# Patient Record
Sex: MALE | URBAN - METROPOLITAN AREA
[De-identification: names, ages, dates, MRNs, and addresses within clinical notes are randomized per-mention and may not be internally consistent; named-entity substitution may affect disease eponyms.]

---

## 2018-12-19 ENCOUNTER — OFFICE VISIT (OUTPATIENT)
Dept: INTERNAL MEDICINE CLINIC | Facility: CLINIC | Age: 58
End: 2018-12-19
Payer: COMMERCIAL

## 2018-12-19 VITALS
BODY MASS INDEX: 28.62 KG/M2 | DIASTOLIC BLOOD PRESSURE: 90 MMHG | HEART RATE: 69 BPM | SYSTOLIC BLOOD PRESSURE: 155 MMHG | HEIGHT: 76 IN | WEIGHT: 235 LBS

## 2018-12-19 DIAGNOSIS — I10 ESSENTIAL HYPERTENSION: ICD-10-CM

## 2018-12-19 DIAGNOSIS — Z00.00 ADULT GENERAL MEDICAL EXAM: ICD-10-CM

## 2018-12-19 DIAGNOSIS — Z12.5 SCREENING PSA (PROSTATE SPECIFIC ANTIGEN): Primary | ICD-10-CM

## 2018-12-19 DIAGNOSIS — Z12.11 SCREEN FOR COLON CANCER: ICD-10-CM

## 2018-12-19 DIAGNOSIS — Z23 NEED FOR INFLUENZA VACCINATION: ICD-10-CM

## 2018-12-19 PROCEDURE — 99203 OFFICE O/P NEW LOW 30 MIN: CPT | Performed by: INTERNAL MEDICINE

## 2018-12-19 PROCEDURE — 90686 IIV4 VACC NO PRSV 0.5 ML IM: CPT | Performed by: INTERNAL MEDICINE

## 2018-12-19 PROCEDURE — 90471 IMMUNIZATION ADMIN: CPT | Performed by: INTERNAL MEDICINE

## 2018-12-19 RX ORDER — AMLODIPINE BESYLATE 5 MG/1
5 TABLET ORAL DAILY
Qty: 90 TABLET | Refills: 3 | Status: SHIPPED | OUTPATIENT
Start: 2018-12-19 | End: 2019-03-19

## 2018-12-19 NOTE — PATIENT INSTRUCTIONS
As we discussed, your blood pressure is high and I started you on a medication called amlodipine. Please take the medicine once a day. Please try to avoid salt as much as possible. Please exercise as we discussed. I have ordered blood works for you.   P dressings  For snacks and desserts  · Yogurt  · Unsalted, air-popped popcorn  · Unsalted nuts or seeds  Stay away from:  · Pies and cakes  · Packaged dessert mixes  · Pizza  · Canned and packaged puddings  · Pretzels, chips, crackers, and nuts—unless the l

## 2018-12-19 NOTE — PROGRESS NOTES
Akhil Ball is a 59year old male. Patient presents with:  Physical: has not had a physical in years, BP was elevated at dental appt recently    HPI:   6-0 past medical history none-year-old male with no significant past medical history here to establish cough, chest tightness and wheezing. Cardiovascular: Negative for chest pain, palpitations and leg swelling. Gastrointestinal: Negative for vomiting, abdominal pain, diarrhea, constipation, blood in stool and abdominal distention.    Endocrine: Henrietta Ruiz REFLEX TO FREE T4; Future    3. Essential hypertension  Discussed low-salt diet. Encourage exercise. Encourage abstinence from tobacco started on amlodipine 5 mg once a day. - CBC, PLATELET; NO DIFFERENTIAL; Future  - COMP METABOLIC PANEL (14);  Future

## 2019-03-08 ENCOUNTER — APPOINTMENT (OUTPATIENT)
Dept: LAB | Age: 59
End: 2019-03-08
Attending: INTERNAL MEDICINE
Payer: COMMERCIAL

## 2019-03-08 DIAGNOSIS — I10 ESSENTIAL HYPERTENSION: ICD-10-CM

## 2019-03-08 DIAGNOSIS — Z12.5 SCREENING PSA (PROSTATE SPECIFIC ANTIGEN): ICD-10-CM

## 2019-03-08 DIAGNOSIS — Z00.00 ADULT GENERAL MEDICAL EXAM: ICD-10-CM

## 2019-03-08 LAB
ALBUMIN SERPL-MCNC: 4 G/DL (ref 3.4–5)
ALBUMIN/GLOB SERPL: 1.3 {RATIO} (ref 1–2)
ALP LIVER SERPL-CCNC: 50 U/L (ref 45–117)
ALT SERPL-CCNC: 29 U/L (ref 16–61)
ANION GAP SERPL CALC-SCNC: 6 MMOL/L (ref 0–18)
AST SERPL-CCNC: 19 U/L (ref 15–37)
BILIRUB SERPL-MCNC: 0.9 MG/DL (ref 0.1–2)
BUN BLD-MCNC: 14 MG/DL (ref 7–18)
BUN/CREAT SERPL: 14.6 (ref 10–20)
CALCIUM BLD-MCNC: 8.8 MG/DL (ref 8.5–10.1)
CHLORIDE SERPL-SCNC: 107 MMOL/L (ref 98–107)
CHOLEST SMN-MCNC: 238 MG/DL (ref ?–200)
CO2 SERPL-SCNC: 27 MMOL/L (ref 21–32)
COMPLEXED PSA SERPL-MCNC: 0.88 NG/ML (ref ?–4)
CREAT BLD-MCNC: 0.96 MG/DL (ref 0.7–1.3)
DEPRECATED RDW RBC AUTO: 44.3 FL (ref 35.1–46.3)
ERYTHROCYTE [DISTWIDTH] IN BLOOD BY AUTOMATED COUNT: 12.3 % (ref 11–15)
GLOBULIN PLAS-MCNC: 3 G/DL (ref 2.8–4.4)
GLUCOSE BLD-MCNC: 80 MG/DL (ref 70–99)
HCT VFR BLD AUTO: 44 % (ref 39–53)
HDLC SERPL-MCNC: 45 MG/DL (ref 40–59)
HGB BLD-MCNC: 14.7 G/DL (ref 13–17.5)
LDLC SERPL CALC-MCNC: 169 MG/DL (ref ?–100)
M PROTEIN MFR SERPL ELPH: 7 G/DL (ref 6.4–8.2)
MCH RBC QN AUTO: 32.6 PG (ref 26–34)
MCHC RBC AUTO-ENTMCNC: 33.4 G/DL (ref 31–37)
MCV RBC AUTO: 97.6 FL (ref 80–100)
NONHDLC SERPL-MCNC: 193 MG/DL (ref ?–130)
OSMOLALITY SERPL CALC.SUM OF ELEC: 289 MOSM/KG (ref 275–295)
PLATELET # BLD AUTO: 229 10(3)UL (ref 150–450)
POTASSIUM SERPL-SCNC: 4 MMOL/L (ref 3.5–5.1)
RBC # BLD AUTO: 4.51 X10(6)UL (ref 4.3–5.7)
SODIUM SERPL-SCNC: 140 MMOL/L (ref 136–145)
TRIGL SERPL-MCNC: 119 MG/DL (ref 30–149)
TSI SER-ACNC: 2.47 MIU/ML (ref 0.36–3.74)
VLDLC SERPL CALC-MCNC: 24 MG/DL (ref 0–30)
WBC # BLD AUTO: 5.9 X10(3) UL (ref 4–11)

## 2019-03-08 PROCEDURE — 84443 ASSAY THYROID STIM HORMONE: CPT

## 2019-03-08 PROCEDURE — 36415 COLL VENOUS BLD VENIPUNCTURE: CPT

## 2019-03-08 PROCEDURE — 80053 COMPREHEN METABOLIC PANEL: CPT

## 2019-03-08 PROCEDURE — 80061 LIPID PANEL: CPT

## 2019-03-08 PROCEDURE — 85027 COMPLETE CBC AUTOMATED: CPT

## 2019-03-19 ENCOUNTER — OFFICE VISIT (OUTPATIENT)
Dept: INTERNAL MEDICINE CLINIC | Facility: CLINIC | Age: 59
End: 2019-03-19
Payer: COMMERCIAL

## 2019-03-19 VITALS
WEIGHT: 229 LBS | DIASTOLIC BLOOD PRESSURE: 80 MMHG | SYSTOLIC BLOOD PRESSURE: 138 MMHG | HEART RATE: 65 BPM | HEIGHT: 76 IN | BODY MASS INDEX: 27.89 KG/M2

## 2019-03-19 DIAGNOSIS — M25.562 PAIN IN BOTH KNEES, UNSPECIFIED CHRONICITY: ICD-10-CM

## 2019-03-19 DIAGNOSIS — M25.561 PAIN IN BOTH KNEES, UNSPECIFIED CHRONICITY: ICD-10-CM

## 2019-03-19 DIAGNOSIS — E78.5 DYSLIPIDEMIA: ICD-10-CM

## 2019-03-19 DIAGNOSIS — I10 ESSENTIAL HYPERTENSION: Primary | ICD-10-CM

## 2019-03-19 PROCEDURE — 99214 OFFICE O/P EST MOD 30 MIN: CPT | Performed by: INTERNAL MEDICINE

## 2019-03-19 RX ORDER — AMLODIPINE BESYLATE 5 MG/1
5 TABLET ORAL DAILY
Qty: 90 TABLET | Refills: 2 | Status: SHIPPED | OUTPATIENT
Start: 2019-03-19 | End: 2019-06-17

## 2019-03-19 NOTE — PATIENT INSTRUCTIONS
As we discussed, may be rechecked your blood pressure, it was good. We will continue the same medications. Try to avoid salt as much as possible. Continue to exercise. He also have high cholesterol with elevated bad cholesterol.   Try to avoid fat fri

## 2019-03-19 NOTE — PROGRESS NOTES
Jerzy Bourne is a 59year old male. Patient presents with:  Hypertension: 3 month f/u  Knee Pain: bilateral    HPI:   8year-old gentleman with a past medical history of hypertension here for follow-up.   During his blood test he was also found to have dysl kg)  12/19/18 : 235 lb (106.6 kg)    Body mass index is 27.87 kg/m².       EXAM:   /80 (BP Location: Left arm, Patient Position: Sitting, Cuff Size: adult)   Pulse 65   Ht 6' 4\" (1.93 m)   Wt 229 lb (103.9 kg)   BMI 27.87 kg/m²      Physical Exam

## 2019-09-25 ENCOUNTER — OFFICE VISIT (OUTPATIENT)
Dept: INTERNAL MEDICINE CLINIC | Facility: CLINIC | Age: 59
End: 2019-09-25
Payer: COMMERCIAL

## 2019-09-25 VITALS
HEIGHT: 76 IN | SYSTOLIC BLOOD PRESSURE: 151 MMHG | BODY MASS INDEX: 27.28 KG/M2 | HEART RATE: 73 BPM | WEIGHT: 224 LBS | DIASTOLIC BLOOD PRESSURE: 82 MMHG

## 2019-09-25 DIAGNOSIS — I10 ESSENTIAL HYPERTENSION: Primary | ICD-10-CM

## 2019-09-25 DIAGNOSIS — E78.5 DYSLIPIDEMIA: ICD-10-CM

## 2019-09-25 DIAGNOSIS — Z12.11 SCREEN FOR COLON CANCER: ICD-10-CM

## 2019-09-25 PROCEDURE — 99213 OFFICE O/P EST LOW 20 MIN: CPT | Performed by: INTERNAL MEDICINE

## 2019-09-25 RX ORDER — AMLODIPINE BESYLATE 5 MG/1
1 TABLET ORAL DAILY
COMMUNITY
Start: 2019-09-15 | End: 2020-02-14

## 2019-09-25 RX ORDER — AMLODIPINE BESYLATE 10 MG/1
10 TABLET ORAL DAILY
Qty: 90 TABLET | Refills: 3 | Status: SHIPPED | OUTPATIENT
Start: 2019-09-25 | End: 2019-12-24

## 2019-09-25 RX ORDER — ATORVASTATIN CALCIUM 10 MG/1
10 TABLET, FILM COATED ORAL DAILY
Qty: 90 TABLET | Refills: 3 | Status: SHIPPED | OUTPATIENT
Start: 2019-09-25 | End: 2021-12-29

## 2019-09-25 NOTE — PROGRESS NOTES
Karlene Toussaint is a 59year old male. Patient presents with:  Medication Question: pt feels that he should be on a statin due to his lipid result    HPI:   -year-old male with a past medical history of hypertension, diet-controlled dyslipidemia here for foll polydipsia, polyphagia and polyuria. Genitourinary: Negative for hematuria. Skin: Negative for wound. Psychiatric/Behavioral: Negative for behavioral problems.      Wt Readings from Last 5 Encounters:  09/25/19 : 224 lb (101.6 kg)  03/19/19 : 229 lb (

## 2019-09-25 NOTE — PATIENT INSTRUCTIONS
As discussed, I have started you on a medication called Lipitor for the cholesterol. Please take it 1 tablet once at night. If you develop any muscle cramps stop taking the medicine for a week then take it every other day.   If your cramps becomes severe,

## 2020-02-14 ENCOUNTER — OFFICE VISIT (OUTPATIENT)
Dept: INTERNAL MEDICINE CLINIC | Facility: CLINIC | Age: 60
End: 2020-02-14
Payer: COMMERCIAL

## 2020-02-14 VITALS
DIASTOLIC BLOOD PRESSURE: 84 MMHG | WEIGHT: 232.19 LBS | RESPIRATION RATE: 18 BRPM | BODY MASS INDEX: 28.27 KG/M2 | TEMPERATURE: 98 F | HEIGHT: 76 IN | HEART RATE: 73 BPM | OXYGEN SATURATION: 96 % | SYSTOLIC BLOOD PRESSURE: 134 MMHG

## 2020-02-14 DIAGNOSIS — E78.5 DYSLIPIDEMIA: ICD-10-CM

## 2020-02-14 DIAGNOSIS — Z23 NEED FOR INFLUENZA VACCINATION: Primary | ICD-10-CM

## 2020-02-14 DIAGNOSIS — I10 ESSENTIAL HYPERTENSION: ICD-10-CM

## 2020-02-14 DIAGNOSIS — Z12.11 SCREEN FOR COLON CANCER: ICD-10-CM

## 2020-02-14 DIAGNOSIS — Z00.00 ADULT GENERAL MEDICAL EXAM: ICD-10-CM

## 2020-02-14 DIAGNOSIS — Z12.5 SCREENING PSA (PROSTATE SPECIFIC ANTIGEN): ICD-10-CM

## 2020-02-14 PROCEDURE — 90686 IIV4 VACC NO PRSV 0.5 ML IM: CPT | Performed by: INTERNAL MEDICINE

## 2020-02-14 PROCEDURE — 99396 PREV VISIT EST AGE 40-64: CPT | Performed by: INTERNAL MEDICINE

## 2020-02-14 PROCEDURE — 90471 IMMUNIZATION ADMIN: CPT | Performed by: INTERNAL MEDICINE

## 2020-02-14 RX ORDER — AMLODIPINE BESYLATE 10 MG/1
10 TABLET ORAL DAILY
Qty: 90 TABLET | Refills: 2 | Status: SHIPPED | OUTPATIENT
Start: 2020-02-14 | End: 2020-05-14

## 2020-02-14 NOTE — PROGRESS NOTES
Zahra Kunz is a 62year old male. Patient presents with:  Physical: annual exam  Imm/Inj: flu vaccine    HPI:   75-year-old gentleman with a past medical history of hypertension and dyslipidemia here for physical.  He has no complaints.     Last month he Gastrointestinal: Negative for vomiting, abdominal pain, diarrhea, constipation, blood in stool and abdominal distention. Endocrine: Negative for cold intolerance and heat intolerance.    Genitourinary: Negative for dysuria, hematuria, flank pain and di normal mood and affect. His behavior is normal. Judgment normal.         ASSESSMENT AND PLAN:   1. Need for influenza vaccination    - INFLUENZA VACCINE, QUAD, PRESERVATIVE FREE, 0.5 ML    2. Dyslipidemia  He was not taking statins.   He is planning to resu

## 2020-02-14 NOTE — PATIENT INSTRUCTIONS
ASSESSMENT AND PLAN:   1. Need for influenza vaccination    - INFLUENZA VACCINE, QUAD, PRESERVATIVE FREE, 0.5 ML    2. Dyslipidemia  He was not taking statins. He is planning to resume.     3. Essential hypertension  I have checked his blood pressure 4 elias

## 2021-01-02 RX ORDER — AMLODIPINE BESYLATE 10 MG/1
10 TABLET ORAL DAILY
Qty: 90 TABLET | Refills: 1 | Status: SHIPPED | OUTPATIENT
Start: 2021-01-02 | End: 2021-07-15

## 2021-01-02 RX ORDER — AMLODIPINE BESYLATE 10 MG/1
10 TABLET ORAL DAILY
COMMUNITY
Start: 2020-12-15 | End: 2021-01-02

## 2021-03-29 ENCOUNTER — IMMUNIZATION (OUTPATIENT)
Dept: LAB | Age: 61
End: 2021-03-29

## 2021-03-29 DIAGNOSIS — Z23 NEED FOR VACCINATION: Primary | ICD-10-CM

## 2021-03-29 PROCEDURE — 0001A COVID 19 PFIZER-BIONTECH: CPT | Performed by: NURSE PRACTITIONER

## 2021-03-29 PROCEDURE — 91300 COVID 19 PFIZER-BIONTECH: CPT | Performed by: NURSE PRACTITIONER

## 2021-04-19 ENCOUNTER — IMMUNIZATION (OUTPATIENT)
Dept: LAB | Age: 61
End: 2021-04-19
Attending: NURSE PRACTITIONER

## 2021-04-19 DIAGNOSIS — Z23 NEED FOR VACCINATION: Primary | ICD-10-CM

## 2021-04-19 PROCEDURE — 91300 COVID 19 PFIZER-BIONTECH: CPT | Performed by: NURSE PRACTITIONER

## 2021-04-19 PROCEDURE — 0002A COVID 19 PFIZER-BIONTECH: CPT | Performed by: NURSE PRACTITIONER

## 2021-06-26 RX ORDER — AMLODIPINE BESYLATE 10 MG/1
TABLET ORAL
Qty: 90 TABLET | Refills: 3 | OUTPATIENT
Start: 2021-06-26

## 2021-06-26 NOTE — TELEPHONE ENCOUNTER
Patient was last seen in February 2020. Please call patient and see whether he is still following up with us. If yes, give him 90-day supply. Instruct patient to make an appointment to see me. If not, please remove my name from the PCP list and also inform pharmacy.   Thank you

## 2021-07-15 ENCOUNTER — TELEPHONE (OUTPATIENT)
Dept: INTERNAL MEDICINE CLINIC | Facility: CLINIC | Age: 61
End: 2021-07-15

## 2021-07-15 RX ORDER — AMLODIPINE BESYLATE 10 MG/1
10 TABLET ORAL DAILY
Qty: 90 TABLET | Refills: 1 | Status: SHIPPED | OUTPATIENT
Start: 2021-07-15 | End: 2021-12-20

## 2021-07-15 NOTE — TELEPHONE ENCOUNTER
Patient is requesting medication refill for    amLODIPine Besylate 10 MG Oral Tab    Patient has schedule an appointment with Dr. Sylvie Vanegas on July 30th. Please send to [address] for a 3 month supply.

## 2021-07-30 ENCOUNTER — OFFICE VISIT (OUTPATIENT)
Dept: INTERNAL MEDICINE CLINIC | Facility: CLINIC | Age: 61
End: 2021-07-30
Payer: COMMERCIAL

## 2021-07-30 VITALS
BODY MASS INDEX: 26.3 KG/M2 | OXYGEN SATURATION: 98 % | WEIGHT: 216 LBS | SYSTOLIC BLOOD PRESSURE: 128 MMHG | HEART RATE: 78 BPM | RESPIRATION RATE: 14 BRPM | HEIGHT: 76 IN | DIASTOLIC BLOOD PRESSURE: 80 MMHG

## 2021-07-30 DIAGNOSIS — Z00.00 ADULT GENERAL MEDICAL EXAM: Primary | ICD-10-CM

## 2021-07-30 DIAGNOSIS — I10 ESSENTIAL HYPERTENSION: ICD-10-CM

## 2021-07-30 DIAGNOSIS — E78.5 DYSLIPIDEMIA: ICD-10-CM

## 2021-07-30 DIAGNOSIS — Z12.11 SCREEN FOR COLON CANCER: ICD-10-CM

## 2021-07-30 PROCEDURE — 3008F BODY MASS INDEX DOCD: CPT | Performed by: INTERNAL MEDICINE

## 2021-07-30 PROCEDURE — 99396 PREV VISIT EST AGE 40-64: CPT | Performed by: INTERNAL MEDICINE

## 2021-07-30 PROCEDURE — 3074F SYST BP LT 130 MM HG: CPT | Performed by: INTERNAL MEDICINE

## 2021-07-30 PROCEDURE — 3079F DIAST BP 80-89 MM HG: CPT | Performed by: INTERNAL MEDICINE

## 2021-07-30 NOTE — PROGRESS NOTES
Jacquelyn Almendarez is a 56year old male. Patient presents with:  Physical    HPI:   47-year-old gentleman with a past medical history of hypertension, diet-controlled dyslipidemia here for physical.  He has no complaints    Past medical history hypertension, di Negative for difficulty urinating, dysuria, flank pain and hematuria. Musculoskeletal: Negative for myalgias. Skin: Negative for color change and wound. Neurological: Negative for seizures, facial asymmetry and speech difficulty.    Psychiatric/Behavi alert and oriented to person, place, and time. Cranial Nerves: No cranial nerve deficit. Coordination: Coordination normal.   Psychiatric:         Mood and Affect: Mood normal.         Behavior: Behavior normal.         Thought Content:  Thought c

## 2021-08-05 ENCOUNTER — LAB ENCOUNTER (OUTPATIENT)
Dept: LAB | Age: 61
End: 2021-08-05
Attending: INTERNAL MEDICINE
Payer: COMMERCIAL

## 2021-08-05 LAB
ALBUMIN SERPL-MCNC: 3.6 G/DL (ref 3.4–5)
ALBUMIN/GLOB SERPL: 1 {RATIO} (ref 1–2)
ALP LIVER SERPL-CCNC: 56 U/L
ALT SERPL-CCNC: 22 U/L
ANION GAP SERPL CALC-SCNC: 7 MMOL/L (ref 0–18)
AST SERPL-CCNC: 15 U/L (ref 15–37)
BILIRUB SERPL-MCNC: 0.8 MG/DL (ref 0.1–2)
BUN BLD-MCNC: 11 MG/DL (ref 7–18)
BUN/CREAT SERPL: 12.9 (ref 10–20)
CALCIUM BLD-MCNC: 8.9 MG/DL (ref 8.5–10.1)
CHLORIDE SERPL-SCNC: 107 MMOL/L (ref 98–112)
CHOLEST SMN-MCNC: 231 MG/DL (ref ?–200)
CO2 SERPL-SCNC: 25 MMOL/L (ref 21–32)
CREAT BLD-MCNC: 0.85 MG/DL
DEPRECATED RDW RBC AUTO: 45.4 FL (ref 35.1–46.3)
ERYTHROCYTE [DISTWIDTH] IN BLOOD BY AUTOMATED COUNT: 13 % (ref 11–15)
EST. AVERAGE GLUCOSE BLD GHB EST-MCNC: 117 MG/DL (ref 68–126)
GLOBULIN PLAS-MCNC: 3.5 G/DL (ref 2.8–4.4)
GLUCOSE BLD-MCNC: 91 MG/DL (ref 70–99)
HBA1C MFR BLD HPLC: 5.7 % (ref ?–5.7)
HCT VFR BLD AUTO: 45 %
HDLC SERPL-MCNC: 50 MG/DL (ref 40–59)
HGB BLD-MCNC: 15.3 G/DL
LDLC SERPL CALC-MCNC: 156 MG/DL (ref ?–100)
M PROTEIN MFR SERPL ELPH: 7.1 G/DL (ref 6.4–8.2)
MCH RBC QN AUTO: 32.1 PG (ref 26–34)
MCHC RBC AUTO-ENTMCNC: 34 G/DL (ref 31–37)
MCV RBC AUTO: 94.3 FL
NONHDLC SERPL-MCNC: 181 MG/DL (ref ?–130)
OSMOLALITY SERPL CALC.SUM OF ELEC: 287 MOSM/KG (ref 275–295)
PATIENT FASTING Y/N/NP: YES
PATIENT FASTING Y/N/NP: YES
PLATELET # BLD AUTO: 275 10(3)UL (ref 150–450)
POTASSIUM SERPL-SCNC: 3.9 MMOL/L (ref 3.5–5.1)
PSA SERPL-MCNC: 0.98 NG/ML (ref ?–4)
RBC # BLD AUTO: 4.77 X10(6)UL
SODIUM SERPL-SCNC: 139 MMOL/L (ref 136–145)
TRIGL SERPL-MCNC: 139 MG/DL (ref 30–149)
TSI SER-ACNC: 3.2 MIU/ML (ref 0.36–3.74)
VLDLC SERPL CALC-MCNC: 27 MG/DL (ref 0–30)
WBC # BLD AUTO: 8.3 X10(3) UL (ref 4–11)

## 2021-08-05 PROCEDURE — 83036 HEMOGLOBIN GLYCOSYLATED A1C: CPT | Performed by: INTERNAL MEDICINE

## 2021-08-05 PROCEDURE — 85027 COMPLETE CBC AUTOMATED: CPT | Performed by: INTERNAL MEDICINE

## 2021-08-05 PROCEDURE — 84153 ASSAY OF PSA TOTAL: CPT | Performed by: INTERNAL MEDICINE

## 2021-08-05 PROCEDURE — 36415 COLL VENOUS BLD VENIPUNCTURE: CPT | Performed by: INTERNAL MEDICINE

## 2021-08-05 PROCEDURE — 84443 ASSAY THYROID STIM HORMONE: CPT | Performed by: INTERNAL MEDICINE

## 2021-08-05 PROCEDURE — 80053 COMPREHEN METABOLIC PANEL: CPT | Performed by: INTERNAL MEDICINE

## 2021-08-05 PROCEDURE — 80061 LIPID PANEL: CPT | Performed by: INTERNAL MEDICINE

## 2021-12-20 RX ORDER — AMLODIPINE BESYLATE 10 MG/1
10 TABLET ORAL DAILY
Qty: 90 TABLET | Refills: 1 | Status: SHIPPED | OUTPATIENT
Start: 2021-12-20 | End: 2022-06-02

## 2021-12-20 NOTE — TELEPHONE ENCOUNTER
Refill passed per [facility] protocol.   Requested Prescriptions   Pending Prescriptions Disp Refills    AMLODIPINE 10 MG Oral Tab [Pharmacy Med Name: AMLODIPINE BESYLATE TABS 10MG] 90 tablet 3     Sig: TAKE 1 TABLET DAILY        Hypertensive Medications Protocol Passed - 12/20/2021  7:15 AM        Passed - CMP or BMP in past 12 months        Passed - Appointment in past 6 or next 3 months        Passed - GFR Non- > 50     Lab Results   Component Value Date    GFRNAA 93 08/05/2021                        Recent Outpatient Visits              4 months ago Adult general medical exam    Álvaro Fulton MD    Office Visit    1 year ago Need for influenza vaccination    [place] [facility], [address], Emily Dobbins MD    Office Visit    2 years ago Essential hypertension    [facility], [address], Emily Dobbins MD    Office Visit    2 years ago Essential hypertension    [facility], [address], Emily Dobbins MD    Office Visit    3 years ago Screening PSA (prostate specific antigen)    [facility], [address], Emily Dobbins MD    Office Visit            Future Appointments         Provider Department Appt Notes    In 1 month Rd Narayanan MD [place] [facility] [place] [facility], [address] F/U

## 2021-12-29 ENCOUNTER — PATIENT MESSAGE (OUTPATIENT)
Dept: INTERNAL MEDICINE CLINIC | Facility: CLINIC | Age: 61
End: 2021-12-29

## 2021-12-30 RX ORDER — ATORVASTATIN CALCIUM 10 MG/1
10 TABLET, FILM COATED ORAL DAILY
Qty: 90 TABLET | Refills: 1 | Status: SHIPPED | OUTPATIENT
Start: 2021-12-30 | End: 2022-12-25

## 2021-12-30 NOTE — TELEPHONE ENCOUNTER
From: Akhil Ball  To: Zay Luis MD  Sent: 12/29/2021 1:15 PM CST  Subject: Need a refill of atorvastatin calcium    10 mg.  With [employer] [facility]

## 2022-02-04 ENCOUNTER — OFFICE VISIT (OUTPATIENT)
Dept: INTERNAL MEDICINE CLINIC | Facility: CLINIC | Age: 62
End: 2022-02-04
Payer: COMMERCIAL

## 2022-02-04 ENCOUNTER — LAB ENCOUNTER (OUTPATIENT)
Dept: LAB | Age: 62
End: 2022-02-04
Attending: INTERNAL MEDICINE
Payer: COMMERCIAL

## 2022-02-04 VITALS
SYSTOLIC BLOOD PRESSURE: 120 MMHG | OXYGEN SATURATION: 97 % | BODY MASS INDEX: 27.76 KG/M2 | DIASTOLIC BLOOD PRESSURE: 80 MMHG | RESPIRATION RATE: 14 BRPM | HEIGHT: 76 IN | HEART RATE: 68 BPM | WEIGHT: 228 LBS

## 2022-02-04 DIAGNOSIS — E78.5 DYSLIPIDEMIA: ICD-10-CM

## 2022-02-04 DIAGNOSIS — E78.5 HYPERLIPEMIA: Primary | ICD-10-CM

## 2022-02-04 DIAGNOSIS — I10 ESSENTIAL HYPERTENSION: Primary | ICD-10-CM

## 2022-02-04 DIAGNOSIS — Z12.11 SCREEN FOR COLON CANCER: ICD-10-CM

## 2022-02-04 LAB
ALBUMIN SERPL-MCNC: 3.9 G/DL (ref 3.4–5)
ALP LIVER SERPL-CCNC: 43 U/L
AST SERPL-CCNC: 25 U/L (ref 15–37)
BILIRUB DIRECT SERPL-MCNC: 0.2 MG/DL (ref 0–0.2)
BILIRUB SERPL-MCNC: 0.8 MG/DL (ref 0.1–2)
CHOLEST SERPL-MCNC: 183 MG/DL (ref ?–200)
FASTING PATIENT LIPID ANSWER: YES
HDLC SERPL-MCNC: 57 MG/DL (ref 40–59)
LDLC SERPL CALC-MCNC: 106 MG/DL (ref ?–100)
NONHDLC SERPL-MCNC: 126 MG/DL (ref ?–130)
PROT SERPL-MCNC: 6.5 G/DL (ref 6.4–8.2)
TRIGL SERPL-MCNC: 113 MG/DL (ref 30–149)
VLDLC SERPL CALC-MCNC: 19 MG/DL (ref 0–30)

## 2022-02-04 PROCEDURE — 80061 LIPID PANEL: CPT

## 2022-02-04 PROCEDURE — 3074F SYST BP LT 130 MM HG: CPT | Performed by: INTERNAL MEDICINE

## 2022-02-04 PROCEDURE — 3079F DIAST BP 80-89 MM HG: CPT | Performed by: INTERNAL MEDICINE

## 2022-02-04 PROCEDURE — 3008F BODY MASS INDEX DOCD: CPT | Performed by: INTERNAL MEDICINE

## 2022-02-04 PROCEDURE — 36415 COLL VENOUS BLD VENIPUNCTURE: CPT

## 2022-02-04 PROCEDURE — 99214 OFFICE O/P EST MOD 30 MIN: CPT | Performed by: INTERNAL MEDICINE

## 2022-02-04 PROCEDURE — 80076 HEPATIC FUNCTION PANEL: CPT | Performed by: INTERNAL MEDICINE

## 2022-04-19 ENCOUNTER — IMMUNIZATION (OUTPATIENT)
Dept: LAB | Age: 62
End: 2022-04-19
Attending: EMERGENCY MEDICINE
Payer: COMMERCIAL

## 2022-04-19 DIAGNOSIS — Z23 NEED FOR VACCINATION: Primary | ICD-10-CM

## 2022-04-19 PROCEDURE — 0054A SARSCOV2 VAC 30MCG TRS SUCR: CPT

## 2022-06-02 RX ORDER — AMLODIPINE BESYLATE 10 MG/1
10 TABLET ORAL DAILY
Qty: 90 TABLET | Refills: 1 | Status: SHIPPED | OUTPATIENT
Start: 2022-06-02 | End: 2022-12-29

## 2022-06-02 NOTE — TELEPHONE ENCOUNTER
Refill passed per [facility] protocol.     Requested Prescriptions   Pending Prescriptions Disp Refills    AMLODIPINE 10 MG Oral Tab [Pharmacy Med Name: AMLODIPINE BESYLATE TABS 10MG] 90 tablet 3     Sig: TAKE 1 TABLET DAILY        Hypertensive Medications Protocol Passed - 6/2/2022  8:52 AM        Passed - CMP or BMP in past 12 months        Passed - Appointment in past 6 or next 3 months        Passed - GFR Non- > 50     Lab Results   Component Value Date    GFRNAA 93 08/05/2021                       Recent Outpatient Visits              3 months ago Essential hypertension    Latosha Mitchell MD    Office Visit    10 months ago Adult general medical exam    [facility], Latosha Madison MD    Office Visit    2 years ago Need for influenza vaccination    [facility], [address], Latosha Nam MD    Office Visit    2 years ago Essential hypertension    [facility], [address], Latosha Nam MD    Office Visit    3 years ago Essential hypertension    [facility], [address], Latosha Nam MD    Office Visit

## 2022-06-16 RX ORDER — ATORVASTATIN CALCIUM 10 MG/1
TABLET, FILM COATED ORAL
Qty: 90 TABLET | Refills: 3 | Status: SHIPPED | OUTPATIENT
Start: 2022-06-16

## 2022-11-30 ENCOUNTER — MED REC SCAN ONLY (OUTPATIENT)
Dept: INTERNAL MEDICINE CLINIC | Facility: CLINIC | Age: 62
End: 2022-11-30

## 2022-12-09 ENCOUNTER — MED REC SCAN ONLY (OUTPATIENT)
Dept: INTERNAL MEDICINE CLINIC | Facility: CLINIC | Age: 62
End: 2022-12-09

## 2023-03-01 ENCOUNTER — TELEPHONE (OUTPATIENT)
Dept: INTERNAL MEDICINE CLINIC | Facility: CLINIC | Age: 63
End: 2023-03-01

## 2023-03-01 RX ORDER — ATORVASTATIN CALCIUM 10 MG/1
10 TABLET, FILM COATED ORAL DAILY
Qty: 90 TABLET | Refills: 0 | Status: SHIPPED | OUTPATIENT
Start: 2023-03-01

## 2023-03-01 NOTE — TELEPHONE ENCOUNTER
Please review; protocol failed/ no protocol  Medication pended for your review and approval.     Requested Prescriptions   Pending Prescriptions Disp Refills    atorvastatin 10 MG Oral Tab 90 tablet 3     Sig: Take 1 tablet (10 mg total) by mouth daily.        Cholesterol Medication Protocol Failed - 3/1/2023 12:07 PM        Failed - ALT in past 12 months        Failed - LDL in past 12 months        Failed - Last ALT < 80     Lab Results   Component Value Date    ALT 40 02/04/2022             Failed - Last LDL < 130     Lab Results   Component Value Date     (H) 02/04/2022             Failed - In person appointment or virtual visit in the past 12 mos or appointment in next 3 mos     Recent Outpatient Visits              1 year ago Essential hypertension    Jesu Thao, [facility], Char Barrientos MD    Office Visit    1 year ago Adult general medical exam    [address] [facility], Char Barrientos MD    Office Visit    3 years ago Need for influenza vaccination    Jesu Thao, [address] [facility],[address], Char Barrientos MD    Office Visit    3 years ago Essential hypertension    Jesu Thao, [address] [facility],[address], Char Barrientos MD    Office Visit    3 years ago Essential hypertension    [address] [facility], Char Barrientos MD    Office Visit

## 2023-03-02 NOTE — TELEPHONE ENCOUNTER
Last refill for 90 days .  He needs appt , last seen in 2/2022    [facility]

## 2023-03-09 ENCOUNTER — HOSPITAL ENCOUNTER (OUTPATIENT)
Dept: LAB | Age: 63
Discharge: HOME | End: 2023-03-09
Attending: ORTHOPAEDIC SURGERY

## 2023-03-09 DIAGNOSIS — S83.242A OTHER TEAR OF MEDIAL MENISCUS, CURRENT INJURY, LEFT KNEE, INITIAL ENCOUNTER: Primary | ICD-10-CM

## 2023-03-09 DIAGNOSIS — S83.242A OTHER TEAR OF MEDIAL MENISCUS, CURRENT INJURY, LEFT KNEE, INITIAL ENCOUNTER: ICD-10-CM

## 2023-03-09 DIAGNOSIS — Z01.818 PRE-OP EXAM: Primary | ICD-10-CM

## 2023-03-09 DIAGNOSIS — Z01.818 PRE-OP EXAM: ICD-10-CM

## 2023-03-09 LAB
ALBUMIN SERPL-MCNC: 3.8 G/DL (ref 3.6–5.1)
ALBUMIN/GLOB SERPL: 1.2 {RATIO} (ref 1–2.4)
ALP SERPL-CCNC: 55 UNITS/L (ref 45–117)
ALT SERPL-CCNC: 31 UNITS/L
ANION GAP SERPL CALC-SCNC: 7 MMOL/L (ref 7–19)
AST SERPL-CCNC: 22 UNITS/L
BASOPHILS # BLD: 0.1 K/MCL (ref 0–0.3)
BASOPHILS NFR BLD: 1 %
BILIRUB SERPL-MCNC: 1 MG/DL (ref 0.2–1)
BUN SERPL-MCNC: 11 MG/DL (ref 6–20)
BUN/CREAT SERPL: 13 (ref 7–25)
CALCIUM SERPL-MCNC: 8.9 MG/DL (ref 8.4–10.2)
CHLORIDE SERPL-SCNC: 106 MMOL/L (ref 97–110)
CO2 SERPL-SCNC: 26 MMOL/L (ref 21–32)
CREAT SERPL-MCNC: 0.88 MG/DL (ref 0.67–1.17)
DEPRECATED RDW RBC: 45.5 FL (ref 39–50)
EOSINOPHIL # BLD: 0.2 K/MCL (ref 0–0.5)
EOSINOPHIL NFR BLD: 3 %
ERYTHROCYTE [DISTWIDTH] IN BLOOD: 12.7 % (ref 11–15)
FASTING DURATION TIME PATIENT: 12 HOURS (ref 0–999)
GFR SERPLBLD BASED ON 1.73 SQ M-ARVRAT: >90 ML/MIN
GLOBULIN SER-MCNC: 3.3 G/DL (ref 2–4)
GLUCOSE SERPL-MCNC: 97 MG/DL (ref 70–99)
HCT VFR BLD CALC: 45.3 % (ref 39–51)
HGB BLD-MCNC: 15.3 G/DL (ref 13–17)
IMM GRANULOCYTES # BLD AUTO: 0 K/MCL (ref 0–0.2)
IMM GRANULOCYTES # BLD: 1 %
LYMPHOCYTES # BLD: 2 K/MCL (ref 1–4)
LYMPHOCYTES NFR BLD: 26 %
MCH RBC QN AUTO: 33.2 PG (ref 26–34)
MCHC RBC AUTO-ENTMCNC: 33.8 G/DL (ref 32–36.5)
MCV RBC AUTO: 98.3 FL (ref 78–100)
MONOCYTES # BLD: 0.7 K/MCL (ref 0.3–0.9)
MONOCYTES NFR BLD: 9 %
NEUTROPHILS # BLD: 4.7 K/MCL (ref 1.8–7.7)
NEUTROPHILS NFR BLD: 60 %
NRBC BLD MANUAL-RTO: 0 /100 WBC
PLATELET # BLD AUTO: 221 K/MCL (ref 140–450)
POTASSIUM SERPL-SCNC: 4.2 MMOL/L (ref 3.4–5.1)
PROT SERPL-MCNC: 7.1 G/DL (ref 6.4–8.2)
RBC # BLD: 4.61 MIL/MCL (ref 4.5–5.9)
SODIUM SERPL-SCNC: 135 MMOL/L (ref 135–145)
WBC # BLD: 7.6 K/MCL (ref 4.2–11)

## 2023-03-09 PROCEDURE — 80053 COMPREHEN METABOLIC PANEL: CPT | Performed by: ORTHOPAEDIC SURGERY

## 2023-03-09 PROCEDURE — 93005 ELECTROCARDIOGRAM TRACING: CPT | Performed by: ORTHOPAEDIC SURGERY

## 2023-03-09 PROCEDURE — 85025 COMPLETE CBC W/AUTO DIFF WBC: CPT | Performed by: ORTHOPAEDIC SURGERY

## 2023-03-09 PROCEDURE — 36415 COLL VENOUS BLD VENIPUNCTURE: CPT | Performed by: ORTHOPAEDIC SURGERY

## 2023-03-10 LAB
ATRIAL RATE (BPM): 68
P AXIS (DEGREES): 7
PR-INTERVAL (MSEC): 166
QRS-INTERVAL (MSEC): 86
QT-INTERVAL (MSEC): 406
QTC: 432
R AXIS (DEGREES): 35
REPORT TEXT: NORMAL
T AXIS (DEGREES): 65
VENTRICULAR RATE EKG/MIN (BPM): 68

## 2023-04-14 RX ORDER — AMLODIPINE BESYLATE 10 MG/1
10 TABLET ORAL DAILY
Qty: 90 TABLET | Refills: 0 | Status: SHIPPED | OUTPATIENT
Start: 2023-04-14

## 2023-04-14 NOTE — TELEPHONE ENCOUNTER
Please review. Protocol failed/ No protocol. Requested Prescriptions   Pending Prescriptions Disp Refills    amLODIPine 10 MG Oral Tab 90 tablet 0     Sig: Take 1 tablet (10 mg total) by mouth daily. Hypertensive Medications Protocol Failed - 4/14/2023  3:55 PM        Failed - CMP or BMP in past 6 months     No results found for this or any previous visit (from the past 4392 hour(s)).               Failed - In person appointment or virtual visit in the past 6 months     Recent Outpatient Visits              1 year ago Essential hypertension    Yue Gomez MD    Office Visit    1 year ago Adult general medical exam    Yue Gomez MD    Office Visit    3 years ago Need for influenza vaccination    Christiano Byrd, [address], Yue Cedillo MD    Office Visit    3 years ago Essential hypertension    [address] [facility], Yue Cedillo MD    Office Visit    4 years ago Essential hypertension    Christiano Byrd, [address], Yue Cedillo MD    Office Visit          Future Appointments         Provider Department Appt Notes    In 3 weeks MD Christiano Walker [place] Nothing special               Failed - EGFRCR or GFRNAA > 50     GFR Evaluation              Passed - In person appointment in the past 12 or next 3 months     Recent Outpatient Visits              1 year ago Essential hypertension    Yue Gomez MD    Office Visit    1 year ago Adult general medical exam    Yue Gomez MD    Office Visit    3 years ago Need for influenza vaccination    Christiano Byrd, [address], Yue Cedillo MD    Office Visit 3 years ago Essential hypertension    [facility], [address], Daniella Tucker MD    Office Visit    4 years ago Essential hypertension    Dieter Amado, Daniella Tucker MD    Office Visit          Future Appointments         Provider Department Appt Notes    In 3 weeks MD Raymond Jacbosa Cuff, [address] [facility] special               Passed - Last BP reading less than 140/90     BP Readings from Last 1 Encounters:  02/04/22 : 120/80                     Recent Outpatient Visits              1 year ago Essential hypertension    Daniella Dallas MD    Office Visit    1 year ago Adult general medical exam    Daniella Dallas MD    Office Visit    3 years ago Need for influenza vaccination    Preeti Cuff, [address], Daniella Tucker MD    Office Visit    3 years ago Essential hypertension    Preeti Cuff, [address], Daniella Tucker MD    Office Visit    4 years ago Essential hypertension    Dieter Amado, Daniella Tucker MD    Office Visit            Future Appointments         Provider Department Appt Notes    In 3 weeks MD Preeti Jacobs Cuff, [address] [place] [facility] special

## 2023-05-08 ENCOUNTER — OFFICE VISIT (OUTPATIENT)
Dept: INTERNAL MEDICINE CLINIC | Facility: CLINIC | Age: 63
End: 2023-05-08

## 2023-05-08 VITALS
WEIGHT: 232 LBS | RESPIRATION RATE: 14 BRPM | BODY MASS INDEX: 28.25 KG/M2 | OXYGEN SATURATION: 96 % | HEART RATE: 70 BPM | DIASTOLIC BLOOD PRESSURE: 84 MMHG | HEIGHT: 76 IN | SYSTOLIC BLOOD PRESSURE: 138 MMHG

## 2023-05-08 DIAGNOSIS — Z00.00 ADULT GENERAL MEDICAL EXAM: Primary | ICD-10-CM

## 2023-05-08 DIAGNOSIS — Z12.11 SCREEN FOR COLON CANCER: ICD-10-CM

## 2023-05-08 PROCEDURE — 3079F DIAST BP 80-89 MM HG: CPT | Performed by: INTERNAL MEDICINE

## 2023-05-08 PROCEDURE — 3008F BODY MASS INDEX DOCD: CPT | Performed by: INTERNAL MEDICINE

## 2023-05-08 PROCEDURE — 3075F SYST BP GE 130 - 139MM HG: CPT | Performed by: INTERNAL MEDICINE

## 2023-05-08 PROCEDURE — 99396 PREV VISIT EST AGE 40-64: CPT | Performed by: INTERNAL MEDICINE

## 2023-05-09 ENCOUNTER — LAB ENCOUNTER (OUTPATIENT)
Dept: LAB | Age: 63
End: 2023-05-09
Attending: INTERNAL MEDICINE
Payer: COMMERCIAL

## 2023-05-09 LAB
CHOLEST SERPL-MCNC: 185 MG/DL (ref ?–200)
DEPRECATED RDW RBC AUTO: 44.3 FL (ref 35.1–46.3)
ERYTHROCYTE [DISTWIDTH] IN BLOOD BY AUTOMATED COUNT: 12.5 % (ref 11–15)
EST. AVERAGE GLUCOSE BLD GHB EST-MCNC: 108 MG/DL (ref 68–126)
FASTING PATIENT LIPID ANSWER: YES
HBA1C MFR BLD: 5.4 % (ref ?–5.7)
HCT VFR BLD AUTO: 43.6 %
HDLC SERPL-MCNC: 68 MG/DL (ref 40–59)
HGB BLD-MCNC: 14.8 G/DL
LDLC SERPL CALC-MCNC: 96 MG/DL (ref ?–100)
MCH RBC QN AUTO: 32.5 PG (ref 26–34)
MCHC RBC AUTO-ENTMCNC: 33.9 G/DL (ref 31–37)
MCV RBC AUTO: 95.6 FL
NONHDLC SERPL-MCNC: 117 MG/DL (ref ?–130)
PLATELET # BLD AUTO: 257 10(3)UL (ref 150–450)
PSA SERPL-MCNC: 0.77 NG/ML (ref ?–4)
RBC # BLD AUTO: 4.56 X10(6)UL
TRIGL SERPL-MCNC: 118 MG/DL (ref 30–149)
TSI SER-ACNC: 2.91 MIU/ML (ref 0.36–3.74)
VLDLC SERPL CALC-MCNC: 19 MG/DL (ref 0–30)
WBC # BLD AUTO: 7 X10(3) UL (ref 4–11)

## 2023-05-09 PROCEDURE — 84153 ASSAY OF PSA TOTAL: CPT | Performed by: INTERNAL MEDICINE

## 2023-05-09 PROCEDURE — 36415 COLL VENOUS BLD VENIPUNCTURE: CPT | Performed by: INTERNAL MEDICINE

## 2023-05-09 PROCEDURE — 85027 COMPLETE CBC AUTOMATED: CPT | Performed by: INTERNAL MEDICINE

## 2023-05-09 PROCEDURE — 83036 HEMOGLOBIN GLYCOSYLATED A1C: CPT | Performed by: INTERNAL MEDICINE

## 2023-05-09 PROCEDURE — 84443 ASSAY THYROID STIM HORMONE: CPT | Performed by: INTERNAL MEDICINE

## 2023-05-09 PROCEDURE — 80061 LIPID PANEL: CPT | Performed by: INTERNAL MEDICINE

## 2023-06-07 ENCOUNTER — MED REC SCAN ONLY (OUTPATIENT)
Dept: INTERNAL MEDICINE CLINIC | Facility: CLINIC | Age: 63
End: 2023-06-07

## 2023-06-19 ENCOUNTER — NURSE ONLY (OUTPATIENT)
Facility: CLINIC | Age: 63
End: 2023-06-19

## 2023-06-19 DIAGNOSIS — Z12.11 SCREENING FOR COLON CANCER: Primary | ICD-10-CM

## 2023-06-20 RX ORDER — SODIUM, POTASSIUM,MAG SULFATES 17.5-3.13G
SOLUTION, RECONSTITUTED, ORAL ORAL
Qty: 1 EACH | Refills: 0 | Status: SHIPPED | OUTPATIENT
Start: 2023-06-20

## 2023-06-21 RX ORDER — ATORVASTATIN CALCIUM 10 MG/1
10 TABLET, FILM COATED ORAL DAILY
Qty: 90 TABLET | Refills: 3 | Status: SHIPPED | OUTPATIENT
Start: 2023-06-21

## 2023-06-21 RX ORDER — AMLODIPINE BESYLATE 10 MG/1
10 TABLET ORAL DAILY
Qty: 90 TABLET | Refills: 0 | Status: SHIPPED | OUTPATIENT
Start: 2023-06-21

## 2023-06-21 NOTE — TELEPHONE ENCOUNTER
Please review. Protocol failed / Has no protocol. Requested Prescriptions   Pending Prescriptions Disp Refills    amLODIPine 10 MG Oral Tab 90 tablet 0     Sig: Take 1 tablet (10 mg total) by mouth daily. Hypertensive Medications Protocol Failed - 6/20/2023  4:29 PM        Failed - CMP or BMP in past 6 months     No results found for this or any previous visit (from the past 4392 hour(s)).             Failed - EGFRCR or GFRNAA > 50     GFR Evaluation            Passed - In person appointment in the past 12 or next 3 months     Recent Outpatient Visits              2 days ago     [address], [address], [address] Only    1 month ago Adult general medical exam    Fortino Galea, Jennet Landau, MD    Office Visit    1 year ago Essential hypertension    Fortino Galea, Jennet Landau, MD    Office Visit    1 year ago Adult general medical exam    [facility] Galea, Jennet Landau, MD    Office Visit    3 years ago Need for influenza vaccination    [address] [place] [address], Jennet Landau, MD    Office Visit                      Passed - Last BP reading less than 140/90     BP Readings from Last 1 Encounters:  05/08/23 : 138/84              Passed - In person appointment or virtual visit in the past 6 months     Recent Outpatient Visits              2 days ago     [address], [address], [place]    Nurse Only    1 month ago Adult general medical exam    Fortino Galea, Jennet Landau, MD    Office Visit    1 year ago Essential hypertension    Fortino Galea, Jennet Landau, MD    Office Visit    1 year ago Adult general medical exam    [place] Galea, Jennet Landau, MD    Office Visit    3 years ago Need for influenza vaccination    [address], [address], Alma Rosa Valdivia MD    Office Visit                            Recent Outpatient Visits              2 days ago     [address], [address], [place]    Nurse Only    1 month ago Adult general medical exam    Nelma Eisenmenger, Molly Dill, MD    Office Visit    1 year ago Essential hypertension    Nelma Eisenmenger, Molly Dill, MD    Office Visit    1 year ago Adult general medical exam    Nelma Eisenmenger, Molly Dill, MD    Office Visit    3 years ago Need for influenza vaccination    Alma Rosa Jaime MD    Office Visit

## 2023-06-21 NOTE — TELEPHONE ENCOUNTER
Please review. Protocol failed or has no protocol. Requested Prescriptions   Pending Prescriptions Disp Refills    atorvastatin 10 MG Oral Tab 90 tablet 0     Sig: Take 1 tablet (10 mg total) by mouth daily.        Cholesterol Medication Protocol Failed - 6/20/2023  4:29 PM        Failed - ALT in past 12 months        Failed - Last ALT < 80     Lab Results   Component Value Date    ALT 40 02/04/2022             Passed - LDL in past 12 months        Passed - Last LDL < 130     Lab Results   Component Value Date    LDL 96 05/09/2023             Passed - In person appointment or virtual visit in the past 12 mos or appointment in next 3 mos     Recent Outpatient Visits              2 days ago     [address], [address], [address]    Nurse Only    1 month ago Adult general medical exam    Rafael Benavides MD    Office Visit    1 year ago Essential hypertension    Rafael Benavides MD    Office Visit    1 year ago Adult general medical exam    Rafael Benavides MD    Office Visit    3 years ago Need for influenza vaccination    [address], [address], Rafael Trujillo MD    Office Visit                           Recent Outpatient Visits              2 days ago     [address], [address], [place]    Nurse Only    1 month ago Adult general medical exam    Rafael Benavides MD    Office Visit    1 year ago Essential hypertension    Rafael Benavides MD    Office Visit    1 year ago Adult general medical exam    Rafael Benavides MD    Office Visit    3 years ago Need for influenza vaccination    [facility] Sterling Serrano, Debbra Gaucher, MD    Office Visit

## 2023-06-26 NOTE — PROGRESS NOTES
Scheduled for:  Colonoscopy [id number]  Provider Name:  Dr Korina Herrera  Date:  11/16/2023  Location:  [facility]  Sedation:  MAC  Time:  9:45 am. (pt is aware that [facility] will call the day before to confirm arrival time)  Prep:  Suprep/Golytely  Meds/Allergies Reconciled?:  Yes  Diagnosis with codes:  CRC Screening Z12.11  Was patient informed to call insurance with codes (Y/N):  Yes   Referral sent?:  Yes  [address] [place] [address] or [address] [place] [address] notified?:  Electronic case request was sent to [facility] via [employer]. Medication Orders:  Pt is aware to NOT take iron pills, herbal meds and diet supplements for 7 days before exam. Also to NOT take any form of alcohol, recreational drugs and any forms of ED meds 24 hours before exam.   Misc Orders:  N/A   Further instructions given by staff:  I discussed the prep instructions with the patient which he verbally understood. I provided patient with prep instruction's sheet in office.

## 2023-08-10 ENCOUNTER — MED REC SCAN ONLY (OUTPATIENT)
Dept: INTERNAL MEDICINE CLINIC | Facility: CLINIC | Age: 63
End: 2023-08-10

## 2023-10-05 RX ORDER — AMLODIPINE BESYLATE 10 MG/1
10 TABLET ORAL DAILY
Qty: 90 TABLET | Refills: 0 | Status: SHIPPED | OUTPATIENT
Start: 2023-10-05

## 2023-10-05 NOTE — TELEPHONE ENCOUNTER
Please review. Protocol failed / No protocol. Requested Prescriptions   Pending Prescriptions Disp Refills    AMLODIPINE 10 MG Oral Tab [Pharmacy Med Name: Amlodipine Besylate 10 Mg Tab Cipl] 90 tablet 0     Sig: Take 1 tablet (10 mg total) by mouth daily. Hypertensive Medications Protocol Failed - 10/4/2023 11:30 AM        Failed - CMP or BMP in past 6 months     No results found for this or any previous visit (from the past 4392 hour(s)).             Failed - EGFRCR or GFRNAA > 50     GFR Evaluation            Passed - In person appointment in the past 12 or next 3 months     Recent Outpatient Visits              3 months ago Screening for colon cancer    [facility]    Nurse Only    5 months ago Adult general medical exam    Alexa Ornelas MD    Office Visit    1 year ago Essential hypertension    Alexa Ornelas MD    Office Visit    2 years ago Adult general medical exam    Alexa Ornelas MD    Office Visit    3 years ago Need for influenza vaccination    [address], [address] [facility] [address]a MD    Office Visit          Future Appointments         Provider Department Appt Notes    In 1 month Gianna Hutchison, [address], [address], [place] Colonoscopy w/MAC @ [facility]                    Passed - Last BP reading less than 140/90     BP Readings from Last 1 Encounters:  05/08/23 : 138/84              Passed - In person appointment or virtual visit in the past 6 months     Recent Outpatient Visits              3 months ago Screening for colon cancer    [address], [address] [facility] [address], [address] Only    5 months ago Adult general medical exam    Alexa Ornelas MD Office Visit    1 year ago Essential hypertension    [address] Latricia Kahn MD    Office Visit    2 years ago Adult general medical exam    [address] Latricia Kahn MD    Office Visit    3 years ago Need for influenza vaccination    [address], [address], Latricia Barboza MD    Office Visit          Future Appointments         Provider Department Appt Notes    In 1 month [address], [address] [facility] [address], [address], [place] Colonoscopy w/MAC @ [facility]                          Recent Outpatient Visits              3 months ago Screening for colon cancer    [address], [address], [place]    Nurse Only    5 months ago Adult general medical exam    [address] Latricia Kahn MD    Office Visit    1 year ago Essential hypertension    [address] Latricia Kahn MD    Office Visit    2 years ago Adult general medical exam    [address] Latricia Kahn MD    Office Visit    3 years ago Need for influenza vaccination    Latricia Sommer MD    Office Visit             Future Appointments         Provider Department Appt Notes    In 1 month [address], [address], [place] Colonoscopy w/MAC @ [facility]

## 2023-11-16 PROCEDURE — 88305 TISSUE EXAM BY PATHOLOGIST: CPT | Performed by: INTERNAL MEDICINE

## 2023-12-19 ENCOUNTER — TELEPHONE (OUTPATIENT)
Dept: GASTROENTEROLOGY | Facility: CLINIC | Age: 63
End: 2023-12-19

## 2023-12-19 NOTE — TELEPHONE ENCOUNTER
Results letter mailed to patient per   Colon recall entered for repeat in 1 yr,11/16/2024  Colon done in 11/16/2023  HM Updated and Patient Outreach was placed for Colon recall   Maura Gonzalez MD  P [facility] Gi Clinical Staff  GI RNs - 1.  Please print and mail this letter to patient; 2. Recall for colonoscopy exam in ONE year

## 2024-01-01 ENCOUNTER — TELEPHONE (OUTPATIENT)
Dept: INTERNAL MEDICINE CLINIC | Facility: CLINIC | Age: 64
End: 2024-01-01

## 2024-01-02 RX ORDER — AMLODIPINE BESYLATE 10 MG/1
10 TABLET ORAL DAILY
Qty: 90 TABLET | Refills: 0 | Status: SHIPPED | OUTPATIENT
Start: 2024-01-02

## 2024-01-02 NOTE — TELEPHONE ENCOUNTER
Please review; protocol failed/ has no protocol    No active /future labs noted   Message sent for patient to make an appointment.     Requested Prescriptions   Pending Prescriptions Disp Refills    AMLODIPINE 10 MG Oral Tab [Pharmacy Med Name: Amlodipine Besylate 10 Mg Tab Cipl] 90 tablet 0     Sig: Take 1 tablet (10 mg total) by mouth daily.       Hypertensive Medications Protocol Failed - 1/1/2024  9:31 AM        Failed - CMP or BMP in past 6 months     No results found for this or any previous visit (from the past 4392 hour(s)).            Failed - In person appointment or virtual visit in the past 6 months     Recent Outpatient Visits              6 months ago Screening for colon cancer    [facility] [place]    Nurse Only    7 months ago Adult general medical exam    [facility]    [facility]    1 year ago Essential hypertension    [facility]    [facility]    2 years ago Adult general medical exam    [facility]    [facility]    3 years ago [facility] influenza vaccination    [facility]                      Failed - EGFRCR or GFRNAA > 50     GFR Evaluation            Passed - In person appointment in the past 12 or next 3 months     Recent Outpatient Visits              6 months ago Screening for colon cancer    [facility] [place]    Nurse Only    7 months ago Adult general medical exam    [facility]    [facility]    1 year ago Essential hypertension    [facility]    [facility]    2 years ago Adult general medical exam    [facility]    [facility]    3 years [facility] influenza [facility]                      Passed - Last BP reading less than 140/90     BP Readings from Last 1 Encounters:   11/16/23 118/65                  Recent Outpatient Visits              6 months ago [facility]    7 months ago [facility] general medical [facility]    [facility]    1 year ago Essential hypertension    [facility]    [facility]    2 years ago Adult general medical exam    [facility]    [facility]    3 years ago Need for influenza vaccination    [facility]

## 2024-04-10 NOTE — TELEPHONE ENCOUNTER
Please review. Protocol Failed; No Protocol  No Active/ Future labs pended     Requested Prescriptions   Pending Prescriptions Disp Refills    atorvastatin 10 MG Oral Tab 90 tablet 3     Sig: Take 1 tablet (10 mg total) by mouth daily.       Cholesterol Medication Protocol Failed - 4/10/2024  7:45 AM        Failed - ALT < 80     Lab Results   Component Value Date    ALT 40 02/04/2022             Failed - ALT resulted within past year        Passed - Lipid panel within past 12 months     Lab Results   Component Value Date    CHOLEST 185 05/09/2023    TRIG 118 05/09/2023    HDL 68 (H) 05/09/2023    LDL 96 05/09/2023    VLDL 19 05/09/2023    NONHDLC 117 05/09/2023             Passed - In person appointment or virtual visit in the past 12 mos or appointment in next 3 mos     Recent Outpatient Visits              9 months ago Screening for colon cancer    [facility] [place]    Nurse Only    11 months ago [place] [facility]    [facility]    2 years ago Essential hypertension    [facility]    2 years ago [facility] general medical exam    [facility]    [facility]    4 years ago Need for influenza vaccination    [facility]                        amLODIPine 10 MG Oral Tab 90 tablet 0     Sig: Take 1 tablet (10 mg total) by mouth daily.       Hypertension Medications Protocol Failed - 4/10/2024  7:45 AM        Failed - CMP or BMP in past 12 months        Failed - EGFRCR or GFRNAA > 50     GFR Evaluation            Passed - Last BP reading less than 140/90     BP Readings from Last 1 Encounters:   11/16/23 118/65               Passed - In person appointment or virtual visit in the past  12 mos or appointment in next 3 mos     Recent Outpatient Visits              9 months ago Screening for colon cancer    [facility] [place]    Nurse Only    11 months ago Adult general medical exam    [facility]    [facility] Visit    2 years ago Essential hypertension    [facility]    [facility]    2 years ago [facility] general medical exam    [facility]    [facility]    4 years ago [facility] influenza vaccination    [facility]                               Recent Outpatient Visits              9 months ago Screening for colon cancer    [facility] [place]    Nurse Only    11 months ago Adult general medical exam    [facility] Tarun Blackwell MD    Office Visit    2 years ago Essential hypertension    [facility]    [facility]    2 years ago Adult general medical exam    [facility]    [facility] Visit    4 years ago [facility] influenza vaccination    [facility]    [facility]

## 2024-04-11 RX ORDER — ATORVASTATIN CALCIUM 10 MG/1
10 TABLET, FILM COATED ORAL DAILY
Qty: 90 TABLET | Refills: 3 | Status: SHIPPED | OUTPATIENT
Start: 2024-04-11

## 2024-04-11 RX ORDER — AMLODIPINE BESYLATE 10 MG/1
10 TABLET ORAL DAILY
Qty: 90 TABLET | Refills: 3 | Status: SHIPPED | OUTPATIENT
Start: 2024-04-11

## 2024-04-11 NOTE — TELEPHONE ENCOUNTER
Last seen in May 2023.  Medication approved.  He needs appointment.  Please inform him.  Thank you

## 2024-07-10 RX ORDER — AMLODIPINE BESYLATE 10 MG/1
10 TABLET ORAL DAILY
Qty: 90 TABLET | Refills: 3 | OUTPATIENT
Start: 2024-07-10

## 2024-08-21 ENCOUNTER — OFFICE VISIT (OUTPATIENT)
Dept: INTERNAL MEDICINE CLINIC | Facility: CLINIC | Age: 64
End: 2024-08-21
Payer: MEDICARE

## 2024-08-21 ENCOUNTER — LAB ENCOUNTER (OUTPATIENT)
Dept: LAB | Age: 64
End: 2024-08-21
Attending: INTERNAL MEDICINE
Payer: MEDICARE

## 2024-08-21 ENCOUNTER — TELEPHONE (OUTPATIENT)
Facility: CLINIC | Age: 64
End: 2024-08-21

## 2024-08-21 VITALS
DIASTOLIC BLOOD PRESSURE: 66 MMHG | HEART RATE: 74 BPM | HEIGHT: 76 IN | BODY MASS INDEX: 25.94 KG/M2 | SYSTOLIC BLOOD PRESSURE: 124 MMHG | OXYGEN SATURATION: 98 % | WEIGHT: 213 LBS

## 2024-08-21 DIAGNOSIS — I10 ESSENTIAL HYPERTENSION: ICD-10-CM

## 2024-08-21 DIAGNOSIS — E78.5 DYSLIPIDEMIA: ICD-10-CM

## 2024-08-21 DIAGNOSIS — L40.9 PSORIASIS: ICD-10-CM

## 2024-08-21 DIAGNOSIS — I10 ESSENTIAL HYPERTENSION: Primary | ICD-10-CM

## 2024-08-21 DIAGNOSIS — Z12.5 SCREENING PSA (PROSTATE SPECIFIC ANTIGEN): ICD-10-CM

## 2024-08-21 DIAGNOSIS — K63.5 POLYP OF COLON, UNSPECIFIED PART OF COLON, UNSPECIFIED TYPE: ICD-10-CM

## 2024-08-21 LAB
ALBUMIN SERPL-MCNC: 4.5 G/DL (ref 3.2–4.8)
ALBUMIN/GLOB SERPL: 1.8 {RATIO} (ref 1–2)
ALP LIVER SERPL-CCNC: 51 U/L
ALT SERPL-CCNC: 17 U/L
ANION GAP SERPL CALC-SCNC: 5 MMOL/L (ref 0–18)
AST SERPL-CCNC: 17 U/L (ref ?–34)
BILIRUB SERPL-MCNC: 1.1 MG/DL (ref 0.2–1.1)
BUN BLD-MCNC: 17 MG/DL (ref 9–23)
BUN/CREAT SERPL: 18.9 (ref 10–20)
CALCIUM BLD-MCNC: 9.6 MG/DL (ref 8.7–10.4)
CHLORIDE SERPL-SCNC: 111 MMOL/L (ref 98–112)
CHOLEST SERPL-MCNC: 169 MG/DL (ref ?–200)
CO2 SERPL-SCNC: 25 MMOL/L (ref 21–32)
COMPLEXED PSA SERPL-MCNC: 0.58 NG/ML (ref ?–4)
CREAT BLD-MCNC: 0.9 MG/DL
DEPRECATED RDW RBC AUTO: 43.9 FL (ref 35.1–46.3)
EGFRCR SERPLBLD CKD-EPI 2021: 95 ML/MIN/1.73M2 (ref 60–?)
ERYTHROCYTE [DISTWIDTH] IN BLOOD BY AUTOMATED COUNT: 12.5 % (ref 11–15)
FASTING PATIENT LIPID ANSWER: YES
FASTING STATUS PATIENT QL REPORTED: YES
GLOBULIN PLAS-MCNC: 2.5 G/DL (ref 2–3.5)
GLUCOSE BLD-MCNC: 96 MG/DL (ref 70–99)
HCT VFR BLD AUTO: 42.5 %
HDLC SERPL-MCNC: 47 MG/DL (ref 40–59)
HGB BLD-MCNC: 14.9 G/DL
LDLC SERPL CALC-MCNC: 108 MG/DL (ref ?–100)
MCH RBC QN AUTO: 33.4 PG (ref 26–34)
MCHC RBC AUTO-ENTMCNC: 35.1 G/DL (ref 31–37)
MCV RBC AUTO: 95.3 FL
NONHDLC SERPL-MCNC: 122 MG/DL (ref ?–130)
OSMOLALITY SERPL CALC.SUM OF ELEC: 293 MOSM/KG (ref 275–295)
PLATELET # BLD AUTO: 228 10(3)UL (ref 150–450)
POTASSIUM SERPL-SCNC: 4.2 MMOL/L (ref 3.5–5.1)
PROT SERPL-MCNC: 7 G/DL (ref 5.7–8.2)
RBC # BLD AUTO: 4.46 X10(6)UL
SODIUM SERPL-SCNC: 141 MMOL/L (ref 136–145)
TRIGL SERPL-MCNC: 76 MG/DL (ref 30–149)
TSI SER-ACNC: 1.59 MIU/ML (ref 0.55–4.78)
VLDLC SERPL CALC-MCNC: 13 MG/DL (ref 0–30)
WBC # BLD AUTO: 6.2 X10(3) UL (ref 4–11)

## 2024-08-21 PROCEDURE — 85027 COMPLETE CBC AUTOMATED: CPT | Performed by: INTERNAL MEDICINE

## 2024-08-21 PROCEDURE — 80053 COMPREHEN METABOLIC PANEL: CPT | Performed by: INTERNAL MEDICINE

## 2024-08-21 PROCEDURE — G0439 PPPS, SUBSEQ VISIT: HCPCS | Performed by: INTERNAL MEDICINE

## 2024-08-21 PROCEDURE — 80061 LIPID PANEL: CPT | Performed by: INTERNAL MEDICINE

## 2024-08-21 PROCEDURE — 84443 ASSAY THYROID STIM HORMONE: CPT | Performed by: INTERNAL MEDICINE

## 2024-08-21 PROCEDURE — 36415 COLL VENOUS BLD VENIPUNCTURE: CPT | Performed by: INTERNAL MEDICINE

## 2024-08-21 RX ORDER — TRIAMCINOLONE ACETONIDE 1 MG/G
CREAM TOPICAL 2 TIMES DAILY PRN
Qty: 60 G | Refills: 1 | Status: SHIPPED | OUTPATIENT
Start: 2024-08-21

## 2024-08-21 NOTE — TELEPHONE ENCOUNTER
Patient calling to schedule colonoscopy, patient informed of the 5 business day turnaround time, thanks.

## 2024-08-21 NOTE — PROGRESS NOTES
Subjective:   Elian Ness is a 63 year old male who presents for a Medicare Subsequent Annual Wellness visit (Pt already had Initial Annual Wellness) and scheduled follow up of multiple significant but stable problems.   63-year-old gentleman here for Medicare annual wellness visit.  He reports that he is doing well.  He has a patch of psoriasis that comes on and off.  He is working hard to lose weight.  He walks 9 miles and eating healthy.  No abuse no depression no falls reported.  Discussed regarding healthcare power of .    History/Other:   Fall Risk Assessment:   He has been screened for Falls and is low risk.      Cognitive Assessment:   He had a completely normal cognitive assessment - see flowsheet entries     Functional Ability/Status:   Elian Ness has a completely normal functional assessment. See flowsheet for details.      Depression Screening (PHQ):  PHQ-2 SCORE: 0  , done 8/16/2024        <5 minutes spent screening and counseling for depression    Advanced Directives:   He does NOT have a Living Will. [Do you have a living will?: No]  He does have a POA but we do NOT have it on file in Epic.    Discussed Advance Care Planning with patient (and family/surrogate if present). Standard forms made available to patient in After Visit Summary.      Patient Active Problem List   Diagnosis    Essential hypertension    Dyslipidemia    Polyp of colon     Allergies:  He has No Known Allergies.    Current Medications:  Outpatient Medications Marked as Taking for the 8/21/24 encounter (Office Visit) with Tarun Laura MD   Medication Sig    triamcinolone 0.1 % External Cream Apply topically 2 (two) times daily as needed.    atorvastatin 10 MG Oral Tab Take 1 tablet (10 mg total) by mouth daily.    amLODIPine 10 MG Oral Tab Take 1 tablet (10 mg total) by mouth daily.       Medical History:  He  has a past medical history of Dyslipidemia (03/19/2019), Essential hypertension (12/19/2018), High blood  pressure, and High cholesterol.  Surgical History:  He  has a past surgical history that includes knee surgery (Left, 03/2023) and colonoscopy (N/A, 11/16/2023).   Family History:  His family history includes Heart Disease in his father; lymphoma in his father; valve replacement in his mother.  Social History:  He  reports that he has been smoking cigarettes. He has never used smokeless tobacco. He reports current alcohol use of about 6.0 standard drinks of alcohol per week. He reports that he does not use drugs.    Tobacco:  Social History     Tobacco Use   Smoking Status Every Day    Current packs/day: 0.50    Types: Cigarettes   Smokeless Tobacco Never     E-Cigarettes/Vaping       Questions Responses    E-Cigarette Use Never User           He does not smoke      CAGE Alcohol Screen:   CAGE screening score of 0 on 8/16/2024, showing low risk of alcohol abuse.      Patient Care Team:  Tarun Laura MD as PCP - General (Internal Medicine)    Review of Systems   Constitutional:  Negative for activity change, appetite change and fever.   HENT:  Negative for congestion and voice change.    Respiratory:  Negative for cough and shortness of breath.    Cardiovascular:  Negative for chest pain.   Gastrointestinal:  Negative for abdominal distention, abdominal pain and vomiting.   Genitourinary:  Negative for hematuria.   Skin:  Negative for wound.   Psychiatric/Behavioral:  Negative for behavioral problems.          Objective:   Physical Exam  Constitutional:       Appearance: Normal appearance.   HENT:      Head: Normocephalic.   Eyes:      Conjunctiva/sclera: Conjunctivae normal.   Cardiovascular:      Rate and Rhythm: Normal rate and regular rhythm.      Heart sounds: Normal heart sounds. No murmur heard.  Pulmonary:      Effort: Pulmonary effort is normal.      Breath sounds: Normal breath sounds. No rhonchi or rales.   Abdominal:      General: Bowel sounds are normal.      Palpations: Abdomen is soft.       Tenderness: There is no abdominal tenderness.   Musculoskeletal:      Cervical back: Neck supple.      Right lower leg: No edema.      Left lower leg: No edema.   Skin:     General: Skin is warm and dry.   Neurological:      General: No focal deficit present.      Mental Status: He is alert and oriented to person, place, and time. Mental status is at baseline.   Psychiatric:         Mood and Affect: Mood normal.         Behavior: Behavior normal.     Psoriasis patch in the elbow.    /66   Pulse 74   Ht 6' 4\" (1.93 m)   Wt 213 lb (96.6 kg)   SpO2 98%   BMI 25.93 kg/m²  Estimated body mass index is 25.93 kg/m² as calculated from the following:    Height as of this encounter: 6' 4\" (1.93 m).    Weight as of this encounter: 213 lb (96.6 kg).    Medicare Hearing Assessment:   Hearing Screening    Time taken: 8/21/2024  8:26 AM  Screening Method: Questionnaire  I have a problem hearing over the telephone: No I have trouble following the conversations when two or more people are talking at the same time: No   I have trouble understanding things on the TV: No I have to strain to understand conversations: No   I have to worry about missing the telephone ring or doorbell: No I have trouble hearing conversations in a noisy background such as a crowded room or restaurant: No   I get confused about where sounds come from: No I misunderstand some words in a sentence and need to ask people to repeat themselves: No   I especially have trouble understanding the speech of women and children: No I have trouble understanding the speaker in a large room such as at a meeting or place of Anab[employer]ist: No   Many people I talk to seem to mumble (or don't speak clearly): No People get annoyed because I misunderstand what they say: No   I misunderstand what others are saying and make inappropriate responses: No I avoid social activities because I cannot hear well and fear I will reply improperly: No   Family members and friends have  told me they think I may have hearing loss: No                   Assessment & Plan:   Elian Ness is a 63 year old male who presents for a Medicare Assessment.     1. Essential hypertension (Primary) controlled  -     CBC, Platelet; No Differential  -     Comp Metabolic Panel (14)  -     Lipid Panel  -     TSH W Reflex To Free T4  -     PSA Total, Screen; Future; Expected date: 08/21/2024  2. Dyslipidemia continue statins  -     CBC, Platelet; No Differential  -     Comp Metabolic Panel (14)  -     Lipid Panel  -     TSH W Reflex To Free T4  -     PSA Total, Screen; Future; Expected date: 08/21/2024  3. Polyp of colon, unspecified part of colon, unspecified type referral given for repeat colonoscopy  Overview:  Dr Dasilva  Orders:  -     GASTRO - INTERNAL  4. Screening PSA (prostate specific antigen)  -     PSA Total, Screen; Future; Expected date: 08/21/2024  5. Psoriasis discussed regarding dermatology follow-up.  Will do a trial of triamcinolone.  If it is getting worse, he will follow with dermatology.  Other orders  -     Triamcinolone Acetonide; Apply topically 2 (two) times daily as needed.  Dispense: 60 g; Refill: 1  Reviewed vaccines.  Discussed regarding pneumonia 20.  The patient indicates understanding of these issues and agrees to the plan.  Reinforced healthy diet, lifestyle, and exercise.      No follow-ups on file.     Tarun Laura MD, 8/21/2024     Supplementary Documentation:   General Health:  In the past six months, have you lost more than 10 pounds without trying?: 2 - No  Has your appetite been poor?: No  Type of Diet: Balanced  How does the patient maintain a good energy level?: Daily Walks  How would you describe your daily physical activity?: Moderate  How would you describe your current health state?: Good  How do you maintain positive mental well-being?: Social Interaction;Puzzles;Games  On a scale of 0 to 10, with 0 being no pain and 10 being severe pain, what is your pain level?: 0 -  (None)  In the past six months, have you experienced urine leakage?: 0-No  At any time do you feel concerned for the safety/well-being of yourself and/or your children, in your home or elsewhere?: No  Have you had any immunizations at another office such as Influenza, Hepatitis B, Tetanus, or Pneumococcal?: No    Health Maintenance   Topic Date Due    Pneumococcal Vaccine: 65+ Years (1 of 2 - PCV) Never done    COVID-19 Vaccine (6 - 2023-24 season) 09/01/2023    Tobacco Cessation Counseling  Never done    Annual Physical  05/08/2024    Colorectal Cancer Screening  11/16/2024    Influenza Vaccine (1) 10/01/2024    PSA  05/09/2025    Annual Depression Screening  Completed    Fall Risk Screening (Annual)  Completed    Zoster Vaccines  Completed

## 2024-08-22 NOTE — TELEPHONE ENCOUNTER
Last Procedure, Date, MD:  11/16/2023, Dr Dasilva, Colonoscopy  Last Diagnosis:  sessile serrated adenoma  Recalled (mth/yrs): 1 year  Sedation Used Previously:  MAC  Last Prep Used (if known):  Golytely/Suprep  Quality Of Prep (if known): excellent  Anticoagulants: no  Diuretics: no  Diabetic Med's (PO/Injectables): no  Weight loss Med's: no  Iron/Herbal/Multivitamin Supplements (RX/OTC): no  Marijuana/Vaping/CBD: no  Height & Weight: 6'4\"/25.94/213  BMI: 25.93  Hx of Cardiac/CVA Issues/(MI/Stroke): no  Devices Pacemaker/Defibrillator/Stents: no  Respiratory Issues/Oxygen Use/GUILLERMO/COPD: no  Issues w/ Anesthesia:    Symptoms (Y/N):   Symptoms Details:     Special Comments/Notes: Wellness exam with Dr Laura yesterday 08/21/2024    Please advise on orders and prep.     Thank you!         Benigno Dasilva MD   Physician  Gastroenterology     Operative Report     Signed     Date of Service: 11/16/2023  9:43 AM  Case Time: Procedures: Surgeons:   11/16/2023  9:58 AM COLONOSCOPY    Benigno Dasilva MD               Signed         [facility]        COLONOSCOPY PROCEDURE REPORT     DATE OF PROCEDURE:  11/16/2023      PCP: Tarun Laura MD     PREOPERATIVE DIAGNOSIS: Screening colonoscopy examination     POSTOPERATIVE DIAGNOSIS:  See impression.     SURGEON:  Benigno Dasilva M.D.     SEDATION:    MAC anesthesia provided by the Anesthesia Service.  MAC anesthesia requested due to anticipated intolerance of colonoscopy examination under safe doses conscious sedation medications     COLONOSCOPY PROCEDURE:   After the nature and risks of colonoscopy examination under MAC anesthesia were discussed with the patient and all questions answered, informed consent was obtained.  The patient was sedated as above.       Digital rectal exam was performed which showed no masses.  The Olympus pediatric video colonoscope was placed in the patient's rectum and advanced under direct  visualization through the entire length of the colon up to the cecum and terminal ileum.  Retroflex exam performed up the ascending colon to the hepatic flexure.  The cecum was confirmed by landmarks including appendiceal orifice, cecal trifold, ileocecal valve.  Retroflexion was performed in the rectum.     The quality of the prep was excellent.     Estimated blood loss: none/insignificant        COLONOSCOPY FINDINGS:    8 sessile colon polyps rectal region, 4-6 mm in size removed from the rectal vault, proximal rectum all by cold snare polypectomy.  Some of the smaller lesions were likely hyperplastic.  Larger somewhat lobulated sessile 10 mm colon polyp removed from the rectosigmoid region by submucosal saline injection and then snare cautery polypectomy technique for complete polypectomy; polyp retrieved intact.  3 more colon polyps size 4-8 mm removed from the rectosigmoid region by cold snare polypectomy technique, suctioned out.  Total of 4 polyps in this area.  4 colon polyps found and removed from the mid ascending colon:  2 sessile polyps 8 and 15 mm were removed by submucosal saline injection then snare cautery polypectomy using a small hexagonal polypectomy snare.  The larger lesion was removed by piecemeal polypectomy technique for complete resection.  On retroflex view of the ascending colon, 2 more 6 mm colon polyps were found and removed by cold snare polypectomy technique, suctioned out.  Small internal hemorrhoids.        RECOMMENDATIONS:  High fiber diet.  Follow-up above colon polyp pathology results.  Total of 16 colon polyps removed today.  Repeat colonoscopy examination in 12 months or as per above polyp pathology results.   No aspirin or NSAID medications for next 10 days to prevent bleeding            Electronically signed by Benigno Dasilva MD at 11/16/2023 11:03 AM  ===================================  Final Diagnosis:      A. Rectal polyps; polypectomy:  Fragments of sessile  serrated adenomas and fragments of hyperplastic polyps (2.1 cm in maximum dimension in aggregate).  No evidence of severe dysplasia/carcinoma in situ or infiltrating carcinoma identified.     B. Rectosigmoid polyps; polypectomy:  Pedunculated tubular adenoma (1.5 cm in maximum dimension).  Additional separate fragments of tubular adenoma and sessile serrated adenoma (1.6 cm in maximum dimension in aggregate).  No evidence of severe dysplasia/carcinoma in situ or infiltrating carcinoma identified.  Inked specimen margin of pedunculated tubular adenoma shows no evidence of adenomatous/dysplastic epithelial changes in the planes of sections examined.     C. Ascending colon polyps; polypectomy:  Fragments of tubular adenomas and sessile serrated adenomas (3.8 cm in maximum dimension in aggregate).  No evidence of severe dysplasia/carcinoma in situ or infiltrating carcinoma identified.

## 2024-08-27 RX ORDER — SODIUM, POTASSIUM,MAG SULFATES 17.5-3.13G
SOLUTION, RECONSTITUTED, ORAL ORAL
Qty: 1 EACH | Refills: 0 | Status: SHIPPED | OUTPATIENT
Start: 2024-08-27

## 2024-08-27 NOTE — TELEPHONE ENCOUNTER
Thanks Meng.    Recent annual wellness exam with PCP Dr. Tarun Laura 8/21/2024    My previous colonoscopy examination 11/16/2023 reviewed; numerous sessile adenoma and sessile serrated adenoma polyps 8-15 mm in size removed from throughout the colon, 12-month follow-up exam recommended.    GI schedulers -    Please schedule colonoscopy exam at [facility] ([facility])    BMI Readings from Last 1 Encounters:   08/21/24 25.93 kg/m²     MAC anesthesia     BP Readings from Last 5 Encounters:   08/21/24 124/66   11/16/23 118/65   05/08/23 138/84   02/04/22 120/80   07/30/21 128/80       Suprep small volume bowel prep if covered by insurance, otherwise   Golytely (PEG) 4L bowel prep  Rx sent in to [facility]      DX = history high risk sessile serrated adenoma colon polyps    Medication instructions:    Hold Cialis/Tadalafil or Viagra/sildenafil or Levitra/Vardenafil, Stendra/Stendra medication > 3 days prior to procedure

## 2025-01-09 PROBLEM — K64.8 INTERNAL HEMORRHOIDS: Status: ACTIVE | Noted: 2025-01-09

## 2025-01-09 PROCEDURE — 88305 TISSUE EXAM BY PATHOLOGIST: CPT | Performed by: INTERNAL MEDICINE

## 2025-01-28 ENCOUNTER — TELEPHONE (OUTPATIENT)
Facility: CLINIC | Age: 65
End: 2025-01-28

## 2025-01-29 NOTE — TELEPHONE ENCOUNTER
----- Message from Benigno Dasilva sent at 1/25/2025  5:30 PM CST -----  GI RNs - please recall for colonoscopy exam in 3yrs

## 2025-04-04 RX ORDER — ATORVASTATIN CALCIUM 10 MG/1
10 TABLET, FILM COATED ORAL DAILY
Qty: 90 TABLET | Refills: 3 | Status: SHIPPED | OUTPATIENT
Start: 2025-04-04

## 2025-04-04 RX ORDER — AMLODIPINE BESYLATE 10 MG/1
10 TABLET ORAL DAILY
Qty: 90 TABLET | Refills: 3 | Status: SHIPPED | OUTPATIENT
Start: 2025-04-04

## 2025-04-04 NOTE — TELEPHONE ENCOUNTER
Refill passed per Clinic protocol.  Requested Prescriptions   Pending Prescriptions Disp Refills    AMLODIPINE 10 MG Oral Tab [Pharmacy Med Name: Amlodipine Besylate 10 Mg Tab Unic] 90 tablet 0     Sig: Take 1 tablet (10 mg total) by mouth daily.       Hypertension Medications Protocol Passed - 4/4/2025 11:18 AM        Passed - CMP or BMP in past 12 months        Passed - Last BP reading less than 140/90     BP Readings from Last 1 Encounters:   01/09/25 126/60               Passed - In person appointment or virtual visit in the past 12 mos or appointment in next 3 mos     Recent Outpatient Visits              [facility]    [facility]    [facility]    1 year ago Adult general medical exam    [facility] Tarun Blackwell MD    Office Visit    3 years ago [facility]    [facility]    3 years ago Adult general medical exam    [facility] Tarun Blackwell MD    Office Visit                      Passed - EGFRCR or GFRNAA > 50     GFR Evaluation  EGFRCR: 95 , resulted on 8/21/2024          Passed - Medication is active on med list          ATORVASTATIN 10 MG Oral Tab [Pharmacy Med Name: Atorvastatin Calcium 10 Mg Tab Nort] 90 tablet 0     Sig: Take 1 tablet (10 mg total) by mouth daily.       Cholesterol Medication Protocol Passed - 4/4/2025 11:18 AM        Passed - ALT < 80     Lab Results   Component Value Date    ALT 17 08/21/2024             Passed - ALT resulted within past year        Passed - Lipid panel within past 12 months     Lab Results   Component Value Date    CHOLEST 169 08/21/2024    TRIG 76 08/21/2024    HDL 47 08/21/2024      (H) 08/21/2024    VLDL 13 08/21/2024    NONHDLC 122 08/21/2024             Passed - In person appointment or virtual visit in the past 12 mos or appointment in next 3 mos     Recent Outpatient Visits              7 months ago Essential hypertension    [facility] Michelle Ko Joseph, MD    Office Visit    1 year ago [facility] for colon [facility]    1 year ago [facility]    [facility]    3 years ago Essential hypertension    [facility]    [facility]    3 years ago Adult general medical exam    [facility] Michelle Ko Joseph, MD    Office Visit                      Passed - Medication is active on med list

## (undated) NOTE — LETTER
August 16, 2021     Lisa Hines  [address] [facility] [facility]      Dear Claude Nobles:    Below are the results from your recent visit:  I have reviewed your blood work.    Blood counts are normal-you are not anemic  Kidney function is normal  Liver functi